# Patient Record
Sex: MALE | Race: OTHER | HISPANIC OR LATINO | Employment: UNEMPLOYED | URBAN - METROPOLITAN AREA
[De-identification: names, ages, dates, MRNs, and addresses within clinical notes are randomized per-mention and may not be internally consistent; named-entity substitution may affect disease eponyms.]

---

## 2022-01-04 ENCOUNTER — HOSPITAL ENCOUNTER (EMERGENCY)
Facility: HOSPITAL | Age: 22
Discharge: HOME | End: 2022-01-05
Attending: EMERGENCY MEDICINE

## 2022-01-04 DIAGNOSIS — L05.01 PILONIDAL ABSCESS: Primary | ICD-10-CM

## 2022-01-04 DIAGNOSIS — R00.0 TACHYCARDIA: ICD-10-CM

## 2022-01-04 LAB
BACTERIA #/AREA URNS HPF: NORMAL /HPF
BASOPHILS # BLD AUTO: 0.06 K/UL (ref 0–0.2)
BASOPHILS NFR BLD: 0.3 % (ref 0–1.9)
BILIRUB UR QL STRIP: NEGATIVE
CLARITY UR: CLEAR
COLOR UR: YELLOW
CTP QC/QA: YES
DIFFERENTIAL METHOD: ABNORMAL
EOSINOPHIL # BLD AUTO: 0.1 K/UL (ref 0–0.5)
EOSINOPHIL NFR BLD: 0.3 % (ref 0–8)
ERYTHROCYTE [DISTWIDTH] IN BLOOD BY AUTOMATED COUNT: 12.3 % (ref 11.5–14.5)
GLUCOSE UR QL STRIP: NEGATIVE
HCT VFR BLD AUTO: 44.9 % (ref 40–54)
HGB BLD-MCNC: 14.8 G/DL (ref 14–18)
HGB UR QL STRIP: ABNORMAL
HYALINE CASTS #/AREA URNS LPF: 0 /LPF
IMM GRANULOCYTES # BLD AUTO: 0.06 K/UL (ref 0–0.04)
IMM GRANULOCYTES NFR BLD AUTO: 0.3 % (ref 0–0.5)
KETONES UR QL STRIP: ABNORMAL
LACTATE SERPL-SCNC: 1.1 MMOL/L (ref 0.5–2.2)
LEUKOCYTE ESTERASE UR QL STRIP: NEGATIVE
LYMPHOCYTES # BLD AUTO: 2.5 K/UL (ref 1–4.8)
LYMPHOCYTES NFR BLD: 13.3 % (ref 18–48)
MCH RBC QN AUTO: 31.6 PG (ref 27–31)
MCHC RBC AUTO-ENTMCNC: 33 G/DL (ref 32–36)
MCV RBC AUTO: 96 FL (ref 82–98)
MICROSCOPIC COMMENT: NORMAL
MONOCYTES # BLD AUTO: 1.7 K/UL (ref 0.3–1)
MONOCYTES NFR BLD: 9.3 % (ref 4–15)
NEUTROPHILS # BLD AUTO: 14.3 K/UL (ref 1.8–7.7)
NEUTROPHILS NFR BLD: 76.5 % (ref 38–73)
NITRITE UR QL STRIP: NEGATIVE
NRBC BLD-RTO: 0 /100 WBC
PH UR STRIP: 6 [PH] (ref 5–8)
PLATELET # BLD AUTO: 374 K/UL (ref 150–450)
PMV BLD AUTO: 9.8 FL (ref 9.2–12.9)
PROT UR QL STRIP: ABNORMAL
RBC # BLD AUTO: 4.69 M/UL (ref 4.6–6.2)
RBC #/AREA URNS HPF: 2 /HPF (ref 0–4)
SARS-COV-2 RDRP RESP QL NAA+PROBE: NEGATIVE
SP GR UR STRIP: 1.02 (ref 1–1.03)
URN SPEC COLLECT METH UR: ABNORMAL
UROBILINOGEN UR STRIP-ACNC: ABNORMAL EU/DL
WBC # BLD AUTO: 18.71 K/UL (ref 3.9–12.7)
WBC #/AREA URNS HPF: 4 /HPF (ref 0–5)

## 2022-01-04 PROCEDURE — 93010 ELECTROCARDIOGRAM REPORT: CPT | Mod: ,,, | Performed by: INTERNAL MEDICINE

## 2022-01-04 PROCEDURE — 10080 I&D PILONIDAL CYST SIMPLE: CPT

## 2022-01-04 PROCEDURE — 83605 ASSAY OF LACTIC ACID: CPT | Performed by: EMERGENCY MEDICINE

## 2022-01-04 PROCEDURE — 25000003 PHARM REV CODE 250: Performed by: EMERGENCY MEDICINE

## 2022-01-04 PROCEDURE — 85025 COMPLETE CBC W/AUTO DIFF WBC: CPT | Performed by: EMERGENCY MEDICINE

## 2022-01-04 PROCEDURE — U0002 COVID-19 LAB TEST NON-CDC: HCPCS | Performed by: EMERGENCY MEDICINE

## 2022-01-04 PROCEDURE — 93010 EKG 12-LEAD: ICD-10-PCS | Mod: ,,, | Performed by: INTERNAL MEDICINE

## 2022-01-04 PROCEDURE — 80053 COMPREHEN METABOLIC PANEL: CPT | Performed by: EMERGENCY MEDICINE

## 2022-01-04 PROCEDURE — 87070 CULTURE OTHR SPECIMN AEROBIC: CPT | Performed by: EMERGENCY MEDICINE

## 2022-01-04 PROCEDURE — 93005 ELECTROCARDIOGRAM TRACING: CPT

## 2022-01-04 PROCEDURE — 87040 BLOOD CULTURE FOR BACTERIA: CPT | Mod: 59 | Performed by: EMERGENCY MEDICINE

## 2022-01-04 PROCEDURE — 81000 URINALYSIS NONAUTO W/SCOPE: CPT | Performed by: EMERGENCY MEDICINE

## 2022-01-04 PROCEDURE — 99285 EMERGENCY DEPT VISIT HI MDM: CPT | Mod: 25

## 2022-01-04 RX ORDER — METRONIDAZOLE 500 MG/1
500 TABLET ORAL
Status: COMPLETED | OUTPATIENT
Start: 2022-01-04 | End: 2022-01-04

## 2022-01-04 RX ORDER — SULFAMETHOXAZOLE AND TRIMETHOPRIM 800; 160 MG/1; MG/1
1 TABLET ORAL
Status: COMPLETED | OUTPATIENT
Start: 2022-01-04 | End: 2022-01-04

## 2022-01-04 RX ORDER — LIDOCAINE HYDROCHLORIDE AND EPINEPHRINE 20; 10 MG/ML; UG/ML
10 INJECTION, SOLUTION INFILTRATION; PERINEURAL ONCE
Status: COMPLETED | OUTPATIENT
Start: 2022-01-04 | End: 2022-01-04

## 2022-01-04 RX ADMIN — SULFAMETHOXAZOLE AND TRIMETHOPRIM 1 TABLET: 800; 160 TABLET ORAL at 10:01

## 2022-01-04 RX ADMIN — LIDOCAINE HYDROCHLORIDE,EPINEPHRINE BITARTRATE 10 ML: 20; .01 INJECTION, SOLUTION INFILTRATION; PERINEURAL at 10:01

## 2022-01-04 RX ADMIN — METRONIDAZOLE 500 MG: 500 TABLET ORAL at 10:01

## 2022-01-05 VITALS
RESPIRATION RATE: 16 BRPM | DIASTOLIC BLOOD PRESSURE: 88 MMHG | HEART RATE: 99 BPM | OXYGEN SATURATION: 99 % | SYSTOLIC BLOOD PRESSURE: 137 MMHG | BODY MASS INDEX: 23.99 KG/M2 | TEMPERATURE: 100 F | WEIGHT: 162 LBS | HEIGHT: 69 IN

## 2022-01-05 LAB
ALBUMIN SERPL BCP-MCNC: 4.2 G/DL (ref 3.5–5.2)
ALP SERPL-CCNC: 107 U/L (ref 55–135)
ALT SERPL W/O P-5'-P-CCNC: 21 U/L (ref 10–44)
ANION GAP SERPL CALC-SCNC: 13 MMOL/L (ref 8–16)
AST SERPL-CCNC: 16 U/L (ref 10–40)
BILIRUB SERPL-MCNC: 1.1 MG/DL (ref 0.1–1)
BUN SERPL-MCNC: 7 MG/DL (ref 6–20)
CALCIUM SERPL-MCNC: 9.7 MG/DL (ref 8.7–10.5)
CHLORIDE SERPL-SCNC: 102 MMOL/L (ref 95–110)
CO2 SERPL-SCNC: 26 MMOL/L (ref 23–29)
CREAT SERPL-MCNC: 0.9 MG/DL (ref 0.5–1.4)
EST. GFR  (AFRICAN AMERICAN): >60 ML/MIN/1.73 M^2
EST. GFR  (NON AFRICAN AMERICAN): >60 ML/MIN/1.73 M^2
GLUCOSE SERPL-MCNC: 104 MG/DL (ref 70–110)
POTASSIUM SERPL-SCNC: 3.5 MMOL/L (ref 3.5–5.1)
PROT SERPL-MCNC: 9.2 G/DL (ref 6–8.4)
SODIUM SERPL-SCNC: 141 MMOL/L (ref 136–145)

## 2022-01-05 PROCEDURE — 25500020 PHARM REV CODE 255: Performed by: EMERGENCY MEDICINE

## 2022-01-05 RX ORDER — SULFAMETHOXAZOLE AND TRIMETHOPRIM 800; 160 MG/1; MG/1
1 TABLET ORAL 2 TIMES DAILY
Qty: 14 TABLET | Refills: 0 | Status: SHIPPED | OUTPATIENT
Start: 2022-01-05 | End: 2022-01-12

## 2022-01-05 RX ORDER — METRONIDAZOLE 500 MG/1
500 TABLET ORAL 3 TIMES DAILY
Qty: 21 TABLET | Refills: 0 | Status: SHIPPED | OUTPATIENT
Start: 2022-01-05 | End: 2022-01-12

## 2022-01-05 RX ADMIN — IOHEXOL 60 ML: 350 INJECTION, SOLUTION INTRAVENOUS at 01:01

## 2022-01-05 NOTE — ED TRIAGE NOTES
Pt arrives complaining of an abscess on his tailbone. Pt states it is an ongoing issue for the last 7yrs and has to have it drained every few months. Pt states this particular flare-up started on 12/30/21 and has not gotten any better. Pt denies any fever or chills at this time.

## 2022-01-05 NOTE — DISCHARGE INSTRUCTIONS
You were seen in the emergency department for an abscess on your buttocks.  We were able to drain it and placed packing.  Please change the packing every 1-2 days.  We have given you a prescription for antibiotics.  Please take it as directed.  Please follow up with your primary care provider or other healthcare professional in the next week for a wound check.  Please return for any new or worsening pain, fevers, worsening redness, spreading rash, numbness, shortness of breath, dizziness, or you become concerned in any other way.     The abscess will likely return.  Please follow-up with a general surgeon to see if further surgery would be curative.
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
## 2022-01-06 ENCOUNTER — HOSPITAL ENCOUNTER (EMERGENCY)
Facility: HOSPITAL | Age: 22
Discharge: HOME | End: 2022-01-06
Attending: EMERGENCY MEDICINE

## 2022-01-06 VITALS
OXYGEN SATURATION: 99 % | RESPIRATION RATE: 20 BRPM | HEART RATE: 87 BPM | DIASTOLIC BLOOD PRESSURE: 84 MMHG | SYSTOLIC BLOOD PRESSURE: 129 MMHG | WEIGHT: 162 LBS | BODY MASS INDEX: 23.99 KG/M2 | TEMPERATURE: 100 F | HEIGHT: 69 IN

## 2022-01-06 DIAGNOSIS — L05.01 PILONIDAL CYST WITH ABSCESS: Primary | ICD-10-CM

## 2022-01-06 DIAGNOSIS — Z48.00 ABSCESS PACKING REMOVAL: ICD-10-CM

## 2022-01-06 PROCEDURE — 99282 EMERGENCY DEPT VISIT SF MDM: CPT

## 2022-01-07 NOTE — ED PROVIDER NOTES
Encounter Date: 1/6/2022       History     Chief Complaint   Patient presents with    packing removal      Pt reports that he was told to come to ED today to have packing removed that was placed on 1/4 for I&D of cyst to tailbone area. Pt reports little drainage to area. Pt denies fever/chills.      21-year-old male presents for evaluation of pilonidal cyst and packing removal.  Reports that pain is greatly improved.  Reports compliance with prescribed antibiotics.  Reports that he was instructed to follow up with a general surgeon but he has not yet done so.  Denies any new worsening pain, fevers, or any additional new symptoms.       [id number] used to communicate with patient.      The history is provided by the patient. The history is limited by a language barrier. A  was used.     Review of patient's allergies indicates:   Allergen Reactions    Pineapple      No past medical history on file.  History reviewed. No pertinent surgical history.  No family history on file.  Social History     Tobacco Use    Smoking status: Never Smoker    Smokeless tobacco: Never Used   Substance Use Topics    Alcohol use: Never    Drug use: Never     Review of Systems   Constitutional: Negative for chills and fever.   HENT: Negative for congestion, postnasal drip, rhinorrhea, sinus pressure and sore throat.    Eyes: Negative for pain and redness.   Respiratory: Negative for apnea, cough, choking, chest tightness, shortness of breath, wheezing and stridor.    Cardiovascular: Negative for chest pain, palpitations and leg swelling.   Gastrointestinal: Negative for abdominal pain, diarrhea, nausea and vomiting.   Genitourinary: Negative for dysuria, flank pain, penile pain and urgency.   Musculoskeletal: Negative for arthralgias, back pain, gait problem, joint swelling, myalgias, neck pain and neck stiffness.   Skin: Positive for wound. Negative for color change, pallor and rash.   Neurological:  Negative for dizziness, tremors, seizures, syncope and light-headedness.   Psychiatric/Behavioral: Negative for confusion. The patient is not nervous/anxious.        Physical Exam     Initial Vitals [01/06/22 1747]   BP Pulse Resp Temp SpO2   129/84 87 20 99.8 °F (37.7 °C) 99 %      MAP       --         Physical Exam    Nursing note and vitals reviewed.  Constitutional: He appears well-developed and well-nourished. He is not diaphoretic. No distress.   HENT:   Head: Normocephalic and atraumatic.   Right Ear: External ear normal.   Left Ear: External ear normal.   Nose: Nose normal.   Eyes: EOM are normal. Right eye exhibits no discharge. Left eye exhibits no discharge.   Neck: Neck supple. No tracheal deviation present.   Normal range of motion.  Cardiovascular: Normal rate.   Pulmonary/Chest: No stridor. No respiratory distress.   Abdominal: Abdomen is soft. He exhibits no distension. There is no abdominal tenderness.   Musculoskeletal:         General: No tenderness. Normal range of motion.      Cervical back: Normal range of motion and neck supple.     Neurological: He is alert and oriented to person, place, and time. He has normal strength. No cranial nerve deficit.   Skin: Skin is warm and dry.   Psychiatric: He has a normal mood and affect. His behavior is normal. Judgment and thought content normal.         ED Course   Procedures  Labs Reviewed - No data to display       Imaging Results    None          Medications - No data to display  Medical Decision Making:   History:   Old Medical Records: I decided to obtain old medical records.  Differential Diagnosis:   Pilonidal abscess, systemic infection, pilonidal cyst, others  ED Management:  History and physical exam as above.    Dressing and packing removed.  Large amount of purulent drainage on the dressings.  No additional drainage is able to be expressed from the incision site.  There is no associated erythema, induration, warmth, or tenderness.  Advised  patient to continue antibiotics as prescribed until they are gone.  Emphasized the importance of follow-up with General surgery.  No evidence of emergent pathology at this time.                      Clinical Impression:   Final diagnoses:  [L05.01] Pilonidal cyst with abscess (Primary)  [Z48.00] Abscess packing removal          ED Disposition Condition    Discharge Stable        ED Prescriptions     None        Follow-up Information     Follow up With Specialties Details Why Contact Info    Rod Carbajal MD General Surgery Schedule an appointment as soon as possible for a visit in 1 week For further evaluation [address]  [address]  [facility]  [facility] [address]  [phone]      [facility] Emergency Dept Emergency Medicine Go to  If symptoms worsen, As needed [address] [facility] [address] [phone]           Shane Cervantes NP  01/06/22 1958

## 2022-01-07 NOTE — DISCHARGE INSTRUCTIONS
Nessa un seguimiento con un cirujano irma se discutió. Continúe tomando todos los antibióticos hasta que desaparezcan por completo.    Fercho por venir a nuestro Departamento de Emergencias hoy. Es importante recordar que algunos problemas son difíciles de diagnosticar y es posible que no se detecten carlene coppola primera visita. Asegúrese de hacer un seguimiento con coppola médico de atención primaria.  Si no tiene farideh, puede comunicarse con el que figura en coppola documentación de serafin o también puede llamar a la [facility] al [phone] para programar ashish dirk con farideh.    Regrese a la janine de emergencias con cualquier pregunta / inquietud, síntomas nuevos / preocupantes, empeoramiento o falta de mejora. No conduzca ni tome decisiones importantes carlene 24 horas si ha recibido analgésicos, sedantes o fármacos que alteran el estado de ánimo carlene coppola visita a la janine de emergencias.

## 2022-01-09 LAB
BACTERIA BLD CULT: NORMAL
BACTERIA BLD CULT: NORMAL
BACTERIA SPEC AEROBE CULT: NO GROWTH

## 2023-09-19 ENCOUNTER — HOSPITAL ENCOUNTER (EMERGENCY)
Facility: HOSPITAL | Age: 23
Discharge: HOME | End: 2023-09-19
Attending: EMERGENCY MEDICINE

## 2023-09-19 VITALS
DIASTOLIC BLOOD PRESSURE: 85 MMHG | BODY MASS INDEX: 24.2 KG/M2 | WEIGHT: 169 LBS | TEMPERATURE: 99 F | OXYGEN SATURATION: 99 % | SYSTOLIC BLOOD PRESSURE: 143 MMHG | HEART RATE: 96 BPM | RESPIRATION RATE: 18 BRPM | HEIGHT: 70 IN

## 2023-09-19 DIAGNOSIS — M25.522 LEFT ELBOW PAIN: Primary | ICD-10-CM

## 2023-09-19 PROCEDURE — 99281 EMR DPT VST MAYX REQ PHY/QHP: CPT

## 2023-09-19 NOTE — ED PROVIDER NOTES
"Encounter Date: 9/19/2023    SCRIBE #1 NOTE: I, Nasrin La, am scribing for, and in the presence of,  Jhon Bravo PA-C.       History     Chief Complaint   Patient presents with    Tingling     Pt reports tinging to left arm that started around 3:40 PM today. Denies any blurred vision, no weakness, no pronator drift.      21 yo M with no PMHx presents to the ED for paresthesias to the L elbow onset around 1h PTA today after caffeine intake. He describes the sensation as "crawling" in character. Also notes similar sensation in his L leg, with associated neck discomfort and dyspnea. States the paresthesias in his L elbow is currently still persistent, but all his other sx had resolved. He is R handed. No trauma or injuries or falls. Denies pain with movement of arm. No other exacerbating or alleviating factors. Denies L hand or wrist pain, nausea, vomiting, fever, or other associated symptoms. States he has experienced sx of anxiety in the past which may have happened today.     The history is provided by the patient. The history is limited by a language barrier (Swedish). A  was used (ID [id number]).     Review of patient's allergies indicates:   Allergen Reactions    Pineapple      History reviewed. No pertinent past medical history.  History reviewed. No pertinent surgical history.  History reviewed. No pertinent family history.  Social History     Tobacco Use    Smoking status: Never    Smokeless tobacco: Never   Substance Use Topics    Alcohol use: Never    Drug use: Never     Review of Systems   Constitutional:  Negative for chills and fever.   HENT:  Negative for congestion, rhinorrhea and sore throat.    Eyes:  Negative for visual disturbance.   Respiratory:  Positive for shortness of breath (resolved). Negative for cough.    Cardiovascular:  Negative for chest pain.   Gastrointestinal:  Negative for abdominal pain, diarrhea, nausea and vomiting.   Genitourinary:  Negative for dysuria, " frequency and hematuria.   Musculoskeletal:  Positive for neck pain (resolved). Negative for back pain.   Skin:  Negative for rash.   Neurological:  Negative for dizziness, weakness and headaches.        +paresthesias to L elbow, L leg.        Physical Exam     Initial Vitals [09/19/23 1627]   BP Pulse Resp Temp SpO2   (!) 143/85 96 18 98.5 °F (36.9 °C) 99 %      MAP       --         Physical Exam    Nursing note and vitals reviewed.  Constitutional: He appears well-developed and well-nourished. He is not diaphoretic. No distress.   HENT:   Head: Atraumatic.   Right Ear: External ear normal.   Left Ear: External ear normal.   Eyes: Conjunctivae are normal.   Neck: No tracheal deviation present.   Normal range of motion.  Cardiovascular:  Normal rate and regular rhythm.           Pulmonary/Chest: No accessory muscle usage or stridor. No tachypnea. No respiratory distress.   Musculoskeletal:         General: No tenderness or edema. Normal range of motion.      Cervical back: Normal range of motion.     Neurological: He has normal strength. He displays no tremor. He displays no seizure activity. Coordination and gait normal.   Skin: Skin is intact. Capillary refill takes less than 2 seconds. No cyanosis.         ED Course   Procedures  Labs Reviewed - No data to display       Imaging Results    None          Medications - No data to display  Medical Decision Making  Normal exam.  No neurologic deficits.  Majority of symptoms have since dissipated.  Endorses history of anxiety and states all symptoms began after a large caffeine intake.  Patient believe symptoms could be related to anxiety.  Atraumatic.  No signs of vascular compromise or infectious process.  Not consistent with radicular process.  I doubt stroke and occult cardiopulmonary process.  No vital sign changes or other associated symptoms concerning for metabolic disturbance today.  Reassured. Requesting work note.             Scribe Attestation:   Scribe #1:  I performed the above scribed service and the documentation accurately describes the services I performed. I attest to the accuracy of the note.                      I, Jhon Bravo PA-C, personally performed the services described in this documentation. All medical record entries made by the scribe were at my direction and in my presence. I have reviewed the chart and agree that the record reflects my personal performance and is accurate and complete.    Clinical Impression:   Final diagnoses:  [M25.522] Left elbow pain (Primary)        ED Disposition Condition    Discharge Stable          ED Prescriptions    None       Follow-up Information       Follow up With Specialties Details Why Contact Info    [facility] -  Schedule an appointment as soon as possible for a visit in 1 day For reevaluation, AND to establish primary if you don't have a  [facility] [address]  [phone]      [facility] Emergency Dept Emergency Medicine Go to  If symptoms worsen or new symptoms develop [address]  [facility] [id number] [phone]             Jhon Bravo PA-C  09/19/23 3559

## 2023-09-19 NOTE — Clinical Note
"Edgar Hermosillo" Domingo Dhillon was seen and treated in our emergency department on 9/19/2023.  He may return to school on 09/20/2023.      If you have any questions or concerns, please don't hesitate to call.      Jhon Bravo, STELLA"

## 2023-09-19 NOTE — DISCHARGE INSTRUCTIONS
Fercho por venir a nuestro Departamento de Emergencias hoy. Es importante recordar que algunos problemas son difíciles de diagnosticar y es posible que no se detecten carlene coppola primera visita. Asegúrese de hacer un seguimiento con coppola médico de atención primaria.  Si no tiene farideh, puede comunicarse con el que figura en coppola documentación de serafin o también puede llamar a la [facility] al [phone] para programar ashish dirk con farideh.    Regrese a la janine de emergencias con cualquier pregunta / inquietud, síntomas nuevos / preocupantes, empeoramiento o falta de mejora. No conduzca ni tome decisiones importantes carlene 24 horas si ha recibido analgésicos, sedantes o fármacos que alteran el estado de ánimo carlene coppola visita a la janine de emergencias.

## 2024-11-16 ENCOUNTER — HOSPITAL ENCOUNTER (EMERGENCY)
Facility: HOSPITAL | Age: 24
Discharge: HOME | End: 2024-11-16
Attending: STUDENT IN AN ORGANIZED HEALTH CARE EDUCATION/TRAINING PROGRAM

## 2024-11-16 VITALS
RESPIRATION RATE: 18 BRPM | OXYGEN SATURATION: 100 % | DIASTOLIC BLOOD PRESSURE: 79 MMHG | SYSTOLIC BLOOD PRESSURE: 118 MMHG | WEIGHT: 169 LBS | BODY MASS INDEX: 24.2 KG/M2 | HEIGHT: 70 IN | HEART RATE: 82 BPM | TEMPERATURE: 98 F

## 2024-11-16 DIAGNOSIS — R51.9 ACUTE NONINTRACTABLE HEADACHE, UNSPECIFIED HEADACHE TYPE: Primary | ICD-10-CM

## 2024-11-16 LAB
ALLENS TEST: ABNORMAL
ANION GAP SERPL CALC-SCNC: 18 MMOL/L (ref 8–16)
BUN SERPL-MCNC: 17 MG/DL (ref 6–30)
CHLORIDE SERPL-SCNC: 101 MMOL/L (ref 95–110)
CREAT SERPL-MCNC: 0.8 MG/DL (ref 0.5–1.4)
GLUCOSE SERPL-MCNC: 110 MG/DL (ref 70–110)
HCT VFR BLD CALC: 43 %PCV (ref 36–54)
POC IONIZED CALCIUM: 1.25 MMOL/L (ref 1.06–1.42)
POC TCO2 (MEASURED): 24 MMOL/L (ref 23–29)
POTASSIUM BLD-SCNC: 3.8 MMOL/L (ref 3.5–5.1)
SAMPLE: ABNORMAL
SITE: ABNORMAL
SODIUM BLD-SCNC: 138 MMOL/L (ref 136–145)

## 2024-11-16 PROCEDURE — 25000003 PHARM REV CODE 250: Performed by: PHYSICIAN ASSISTANT

## 2024-11-16 PROCEDURE — 82565 ASSAY OF CREATININE: CPT

## 2024-11-16 PROCEDURE — 99285 EMERGENCY DEPT VISIT HI MDM: CPT | Mod: 25

## 2024-11-16 PROCEDURE — 82962 GLUCOSE BLOOD TEST: CPT

## 2024-11-16 PROCEDURE — 99900035 HC TECH TIME PER 15 MIN (STAT)

## 2024-11-16 PROCEDURE — 25500020 PHARM REV CODE 255: Performed by: STUDENT IN AN ORGANIZED HEALTH CARE EDUCATION/TRAINING PROGRAM

## 2024-11-16 PROCEDURE — 84132 ASSAY OF SERUM POTASSIUM: CPT

## 2024-11-16 PROCEDURE — 85014 HEMATOCRIT: CPT

## 2024-11-16 PROCEDURE — 84295 ASSAY OF SERUM SODIUM: CPT

## 2024-11-16 PROCEDURE — 82330 ASSAY OF CALCIUM: CPT

## 2024-11-16 RX ORDER — KETOROLAC TROMETHAMINE 30 MG/ML
15 INJECTION, SOLUTION INTRAMUSCULAR; INTRAVENOUS
Status: DISCONTINUED | OUTPATIENT
Start: 2024-11-16 | End: 2024-11-16

## 2024-11-16 RX ORDER — SPIRONOLACTONE 100 MG/1
100 TABLET, FILM COATED ORAL DAILY
COMMUNITY

## 2024-11-16 RX ORDER — ESTRADIOL 2 MG/1
2 TABLET ORAL DAILY
COMMUNITY

## 2024-11-16 RX ORDER — ACETAMINOPHEN 500 MG
1000 TABLET ORAL
Status: COMPLETED | OUTPATIENT
Start: 2024-11-16 | End: 2024-11-16

## 2024-11-16 RX ADMIN — ACETAMINOPHEN 1000 MG: 500 TABLET ORAL at 03:11

## 2024-11-16 RX ADMIN — IOHEXOL 100 ML: 350 INJECTION, SOLUTION INTRAVENOUS at 04:11

## 2024-11-16 NOTE — ED PROVIDER NOTES
Encounter Date: 11/16/2024       History     Chief Complaint   Patient presents with    Headache     Pt arrived to the ED due to constant h/a that started today. No relief from motrin taken at 8pm. Denies blurry vision, n/v, chest pain, or SOB. Pt is currently taking hormones      23yo M presents to ED with chief complaint occipital headache x 2d.    Patient states acute onset occipital headache yesterday, resolved.  Began again with similar headache around 1 this morning.  Headache is occipital, described as pressure type headache.  Admits to associated dizziness.  Describes dizziness as feeling a bit unsteady.  Patient took over-the-counter migraine medication which contains Tylenol, aspirin, and caffeine.  Patient thinks may develop dizziness due to caffeine intake, unsure if it began prior to the medication intake.  Denies history of similar headaches.  No recent illness.  Denies sinus issues.  No otalgia.  Denies fever chills myalgias.  No neck pain or stiffness.  Denies head trauma.  Denies known personal or family history of cerebral aneurysm or subarachnoid hemorrhage.  No arm or leg weakness, slurred speech, facial droop, unsteady gait, aphasia.  No history of CVA.  No neck pain or stiffness.  Dizziness resolved prior to my evaluation.  Denies relief of headache with medications taken prior to arrival.    Denies significant pmh    Male transitioning to female. On Estradiol, Spironolactone x 2yrs. No recent medication changes. Local physician.       Review of patient's allergies indicates:   Allergen Reactions    Pineapple      History reviewed. No pertinent past medical history.  History reviewed. No pertinent surgical history.  No family history on file.  Social History     Tobacco Use    Smoking status: Never    Smokeless tobacco: Never   Substance Use Topics    Alcohol use: Never    Drug use: Never     Review of Systems   Constitutional:  Negative for chills and fever.   Eyes:  Negative for visual  disturbance.   Respiratory:  Negative for shortness of breath.    Cardiovascular:  Negative for chest pain.   Gastrointestinal:  Negative for nausea and vomiting.   Musculoskeletal:  Negative for neck pain and neck stiffness.   Neurological:  Positive for dizziness and headaches. Negative for seizures, syncope, facial asymmetry, speech difficulty, weakness and numbness.       Physical Exam     Initial Vitals [11/16/24 0249]   BP Pulse Resp Temp SpO2   129/83 91 20 98.1 °F (36.7 °C) 98 %      MAP       --         Physical Exam    Nursing note and vitals reviewed.  Constitutional: He appears well-developed and well-nourished. He is not diaphoretic. No distress.   Overall well-appearing nontoxic.  Ambulates with normal, steady gait.   HENT:   Head: Normocephalic and atraumatic.   Eyes: EOM are normal.   Neck: Neck supple.   Normal range of motion.  Pulmonary/Chest: No respiratory distress.   Musculoskeletal:         General: No tenderness. Normal range of motion.      Cervical back: Normal range of motion and neck supple.     Neurological: He is alert and oriented to person, place, and time. GCS score is 15. GCS eye subscore is 4. GCS verbal subscore is 5. GCS motor subscore is 6.   Skin: Skin is warm.   Psychiatric: He has a normal mood and affect. Thought content normal.         ED Course   Procedures  Labs Reviewed   ISTAT PROCEDURE - Abnormal       Result Value    POC Glucose 110      POC BUN 17      POC Creatinine 0.8      POC Sodium 138      POC Potassium 3.8      POC Chloride 101      POC TCO2 (MEASURED) 24      POC Anion Gap 18 (*)     POC Ionized Calcium 1.25      POC Hematocrit 43      Sample VENOUS      Site Other      Allens Test N/A     ISTAT CHEM8          Imaging Results              CTA Head and Neck (xpd) (Final result)  Result time 11/16/24 05:06:58      Final result by Breanna Rojas MD (11/16/24 05:06:58)                   Impression:      No CT evidence of acute intracranial abnormality.  If the  patient's headaches are sufficiently clinically suspicious, or associated with signs of elevated ICP, focal neurologic deficits, nausea, or vomiting or if there is clinical concern for acute ischemia, further evaluation with MRI is recommended if there are no clinical contraindications.    CTA head and neck appears within normal limits without evidence of hemodynamically significant stenosis or proximal large vessel intracranial occlusion.      Electronically signed by: Breanna Rojas MD  Date:    11/16/2024  Time:    05:06               Narrative:    EXAMINATION:  CTA HEAD AND NECK (XPD)    CLINICAL HISTORY:  Dizziness, persistent/recurrent, cardiac or vascular cause suspected;occipital headache, dizziness; estrogen use; question sinus thrombus;    TECHNIQUE:  Non contrast low dose axial images were obtained though the head. CT angiogram was performed from the level of the nori to the top of the head following the IV administration of 100mL of Omnipaque 350.   Sagittal and coronal reconstructions and maximum intensity projection reconstructions were performed. Arterial stenosis percentages are based on NASCET measurement criteria.    COMPARISON:  None    FINDINGS:  Head CT:    There is no acute intracranial hemorrhage, hydrocephalus, midline shift or mass effect. Gray-white matter differentiation appears maintained. The basal cisterns are patent. The visualized paranasal sinuses and mastoid air cells are clear of acute process.  The visualized bones of the calvarium demonstrate no acute osseous abnormality    CTA:    Aorta: Normal 3 vessel arch.    Extracranial carotid circulation: No hemodynamically significant stenosis, aneurysmal dilatation, or dissection.    Extracranial vertebral circulation: No hemodynamically significant stenosis, aneurysmal dilatation, or dissection.    Intracranial Arteries: The bilateral petrous, cavernous and supraclinoid segments the ICAs are patent.The right A1 segment is hypoplastic  relative to the left.  The proximal ACAs are patent.  Bilateral MCAs appear patent.  The distal vertebral arteries and basilar artery appear patent.  The bilateral PCAs appear patent.    Venous structures (limited evaluation): Allowing for limitation of CT technique, the dural venous sinuses appear appropriately opacified noting the left transverse and sigmoid sinus appear hypoplastic relative to the right.    Non-Vascular Structures of the Neck/Thoracic Inlet (limited evaluation): The visualized mucosal surfaces of the aerodigestive tract are within normal limits.  The vocal cords are relatively symmetric.  The parotid glands and submandibular glands are unremarkable.  The thyroid gland is within normal limits.  There are normal and mildly prominent cervical chain lymph nodes present.  The visualized lung apices are free of pleural fluid or focal consolidation.  No definite evidence of large central pulmonary embolus.  Visualized osseous structures appear intact.                                       Medications   acetaminophen tablet 1,000 mg (1,000 mg Oral Given 11/16/24 0328)   iohexoL (OMNIPAQUE 350) injection 100 mL (100 mLs Intravenous Given 11/16/24 0407)     Medical Decision Making  Differential diagnosis:  Dural venous thrombus, CVA, headache, migraine, subarachnoid hemorrhage, cerebral aneurysm    Amount and/or Complexity of Data Reviewed  External Data Reviewed: notes.  Radiology: ordered and independent interpretation performed. Decision-making details documented in ED Course.  Discussion of management or test interpretation with external provider(s): Given reported dizziness, estrogen use,     Risk  OTC drugs.  Prescription drug management.               ED Course as of 11/16/24 0520   Sat Nov 16, 2024   0519 Headache resolved on re-evaluation.    Although I have not definitively ruled out dural venous thrombus, given grossly unremarkable CTA, resolution of headaches, no persistent dizziness, no focal  deficits, I do feel we can attempted outpatient follow-up.  Advised contacting PCP for repeat exam and re-evaluation of any persistent symptoms.  Discussed over-the-counter Tylenol, ibuprofen as needed for continued headache.  Discussed interim return precautions, patient is comfortable with plan, feels comfortable with discharge. [SM]      ED Course User Index  [SM] William Loredo PA-C                           Clinical Impression:  Final diagnoses:  [R51.9] Acute nonintractable headache, unspecified headache type (Primary)          ED Disposition Condition    Discharge Stable          ED Prescriptions    None       Follow-up Information       Follow up With Specialties Details Why Contact Info    Primary care provider  Schedule an appointment as soon as possible for a visit  For reevaluation              William Loredo, STELLA  11/16/24 [id number]

## 2024-11-16 NOTE — DISCHARGE INSTRUCTIONS
Comuníquese con coppola médico de cabecera para que le linh un seguimiento y ashish reevaluación, especialmente si los yue de usha continúan. Puede marietta Tylenol o ibuprofeno de venta marti según sea necesario para el dolor de usha. Regrese a kj servicio de urgencias si no hay mejoría con los medicamentos actuales, si comienza a tener náuseas y vómitos, si presenta mareos, si se siente mareado o siente que se va a desmayar, si tiene dificultad para caminar, si comienza a tener ashish sensibilidad grave a la lisandra o si se presenta cualquier otro problema.    Contact your primary care provider for follow-up and re-evaluation, especially if headaches continue.  You can take over-the-counter Tylenol or ibuprofen as needed for headache.  Return to this ED if there is no improvement with current medicines, if you begin with nausea vomiting, if you develop dizziness, if you feel lightheaded or feel as if you are going to pass out, if you have difficulty walking, if you begin with severe sensitivity to light, if any other problems occur.

## 2025-03-22 ENCOUNTER — HOSPITAL ENCOUNTER (EMERGENCY)
Facility: HOSPITAL | Age: 25
Discharge: HOME | End: 2025-03-22
Attending: STUDENT IN AN ORGANIZED HEALTH CARE EDUCATION/TRAINING PROGRAM

## 2025-03-22 VITALS
DIASTOLIC BLOOD PRESSURE: 75 MMHG | SYSTOLIC BLOOD PRESSURE: 120 MMHG | WEIGHT: 170 LBS | RESPIRATION RATE: 20 BRPM | BODY MASS INDEX: 24.39 KG/M2 | OXYGEN SATURATION: 100 % | TEMPERATURE: 98 F | HEART RATE: 74 BPM

## 2025-03-22 DIAGNOSIS — S05.01XA ABRASION OF RIGHT CORNEA, INITIAL ENCOUNTER: Primary | ICD-10-CM

## 2025-03-22 PROCEDURE — 25000003 PHARM REV CODE 250

## 2025-03-22 PROCEDURE — 99283 EMERGENCY DEPT VISIT LOW MDM: CPT

## 2025-03-22 RX ORDER — ERYTHROMYCIN 5 MG/G
OINTMENT OPHTHALMIC
Status: COMPLETED | OUTPATIENT
Start: 2025-03-22 | End: 2025-03-22

## 2025-03-22 RX ORDER — PROPARACAINE HYDROCHLORIDE 5 MG/ML
1 SOLUTION/ DROPS OPHTHALMIC
Status: COMPLETED | OUTPATIENT
Start: 2025-03-22 | End: 2025-03-22

## 2025-03-22 RX ORDER — ERYTHROMYCIN 5 MG/G
OINTMENT OPHTHALMIC
Qty: 1 G | Refills: 0 | Status: SHIPPED | OUTPATIENT
Start: 2025-03-22

## 2025-03-22 RX ADMIN — FLUORESCEIN SODIUM 1 EACH: 1 STRIP OPHTHALMIC at 10:03

## 2025-03-22 RX ADMIN — PROPARACAINE HYDROCHLORIDE 1 DROP: 5 SOLUTION/ DROPS OPHTHALMIC at 10:03

## 2025-03-22 RX ADMIN — ERYTHROMYCIN: 5 OINTMENT OPHTHALMIC at 11:03

## 2025-03-22 NOTE — ED TRIAGE NOTES
"Pt presented to the ED with complaints of R eye pain that began this morning. Pt reports "I was walking out side and it felt like something either got into my eye or bit my eye". Pt reports wearing glasses daily but is not wearing them currently. R eye does appear slight pink upon initial assessment.   "

## 2025-03-22 NOTE — Clinical Note
"Edgar Hermosillo" Domingo Dhillon was seen and treated in our emergency department on 3/22/2025.  He may return to work on 03/25/2025.       If you have any questions or concerns, please don't hesitate to call.      Keith Madera PA-C"

## 2025-03-22 NOTE — DISCHARGE INSTRUCTIONS
Si tiene ashish abrasión corneal o un rasguño en el claudy, use el ungüento según lo prescrito. Consulte con coppola oftalmólogo en las próximas 24 horas.   Fercho por venir a nuestro Departamento de Emergencias hoy. Es importante recordar que algunos problemas son difíciles de diagnosticar y es posible que no se detecten carlene coppola primera visita. Asegúrese de hacer un seguimiento con coppola médico de atención primaria.  Si no tiene farideh, puede comunicarse con el que figura en coppola documentación de serafin o también puede llamar a la [facility] al [phone] para programar ashish dirk con farideh.    Regrese a la janine de emergencias con cualquier pregunta / inquietud, síntomas nuevos / preocupantes, empeoramiento o falta de mejora. No conduzca ni tome decisiones importantes carlene 24 horas si ha recibido analgésicos, sedantes o fármacos que alteran el estado de ánimo carlene coppola visita a la janine de emergencias.

## 2025-03-22 NOTE — ED NOTES
Visual acuity screening completed. R eye 20/40. L eye 20/50. Bilateral vision 20/50. Pt reports wearing glasses daily but is not wearing them currently.

## 2025-03-22 NOTE — ED PROVIDER NOTES
Encounter Date: 3/22/2025    SCRIBE #1 NOTE: I, Barbara Rollins, am scribing for, and in the presence of,  Keith Madera PA-C. I have scribed the following portions of the note - Other sections scribed: HPI, ROS.       History     Chief Complaint   Patient presents with    Eye Pain     Redness to right eye since this morning. Pt denies injury . Pt denies vision change      Edgarcharlene Dhillon is a 24 y.o. adult, with no pertinent PMHx, who presents to the ED with acute R eye pain since this morning. Patient reports leaving the house when they began feeling a painful burning sensation to internal eye. Patient believes a bug might have stung their eye but did not see a bug when eye pain began. Denies experiencing symptoms in the past. Denies wearing makeup to sleep the night before symptoms began. Denies use of contact lenses. No other exacerbating or alleviating factors. Denies cough, congestion, rhinorrhea, sneezing, nausea, emesis, abdominal pain, diarrhea, vision changes, eye discharge, SOB, globus sensation or other associated symptoms.      The history is provided by the patient. The history is limited by a language barrier. A  was used (ID#:[id number]).     Review of patient's allergies indicates:   Allergen Reactions    Pineapple      History reviewed. No pertinent past medical history.  History reviewed. No pertinent surgical history.  No family history on file.  Social History[1]  Review of Systems   Constitutional:  Negative for chills and fever.   HENT:  Negative for congestion, rhinorrhea, sneezing and sore throat.    Eyes:  Positive for pain and redness. Negative for discharge and visual disturbance.   Respiratory:  Negative for cough and shortness of breath.         (-) globus sensation    Cardiovascular:  Negative for chest pain.   Gastrointestinal:  Negative for abdominal pain, nausea and vomiting.   Genitourinary:  Negative for dysuria.   Skin:  Negative for rash.   Neurological:   Negative for syncope and headaches.       Physical Exam     Initial Vitals [03/22/25 0929]   BP Pulse Resp Temp SpO2   122/61 82 20 97.3 °F (36.3 °C) 100 %      MAP       --         Physical Exam    Nursing note and vitals reviewed.  Constitutional: He appears well-developed and well-nourished.   HENT:   Head: Normocephalic and atraumatic.   Right Ear: External ear normal.   Left Ear: External ear normal.   Nose: Nose normal. Mouth/Throat: Oropharynx is clear and moist.   Eyes: EOM and lids are normal. Pupils are equal, round, and reactive to light. Right eye exhibits no chemosis, no discharge, no exudate and no hordeolum. No foreign body present in the right eye. Left eye exhibits no chemosis, no discharge, no exudate and no hordeolum. No foreign body present in the left eye. Right conjunctiva is injected. Right conjunctiva has no hemorrhage. Left conjunctiva is not injected. Left conjunctiva has no hemorrhage. No scleral icterus.   Wood's lamp examination shows small corneal abrasion on the right eye.  Patient admits to relief of symptoms after administration of proparacaine.   Neck: Trachea normal. Neck supple.   Cardiovascular:  Normal rate, regular rhythm, S1 normal, S2 normal and normal heart sounds.     Exam reveals no gallop and no friction rub.       No murmur heard.  Pulmonary/Chest: Effort normal and breath sounds normal. No respiratory distress. He has no decreased breath sounds. He has no wheezes. He has no rhonchi. He has no rales.   Abdominal: He exhibits no distension.   Musculoskeletal:         General: Normal range of motion.      Cervical back: Neck supple.      Comments: Patient is able to move all extremities. 5/5 strength at upper and lower extremities.        Lymphadenopathy:     He has no cervical adenopathy.   Neurological: He is alert. He has normal strength.   Skin: Skin is warm and dry.   Psychiatric: He has a normal mood and affect.         ED Course   Procedures  Labs Reviewed - No data  to display       Imaging Results    None          Medications   proparacaine 0.5 % ophthalmic solution 1 drop (1 drop Right Eye Given by Provider 3/22/25 1033)   fluorescein ophthalmic strip 1 each (1 each Right Eye Given 3/22/25 1034)   erythromycin 5 mg/gram (0.5 %) ophthalmic ointment ( Right Eye Given 3/22/25 1101)     Medical Decision Making  Encounter Date: 3/22/2025    24 y.o. adult presents for evaluation of right-sided eye pain and redness.  Hemodynamically stable. Afebrile. Phonating and protecting the airway spontaneously. No clinical evidence for cardiovascular instability or impending airway compromise.  Remainder of physical exam as above. Notable for mild erythema to the right eye.  No discharge or drainage.  Prior medical records reviewed. PMD note reviewed. Current co-morbidities considered that will impact clinical decision making include as above.   Vitals range:   Temp:  [97.3 °F (36.3 °C)-98.2 °F (36.8 °C)] 98.2 °F (36.8 °C)  Pulse:  [74-82] 74  Resp:  [20] 20  SpO2:  [100 %] 100 %  BP: (120-122)/(61-75) 120/75    Differential diagnoses includes but is not limited to:  This is an emergent evaluation of a 24 y.o. adult presenting to the ED for eye pain. Denies use of contact lenses, trauma, foreign body sensation, and purulent drainage. Afebrile. Patient is non-toxic appearing and in no acute distress. No gross vision loss. Corneal abrasion present on Wood's lamp examination. No foreign bodies. I considered but doubt orbital compartment syndrome. No Debora's sign or teardrop pupil to suggest open globe injury. No hyphema or subconjunctival hemorrhage. No blepharospasm, ischemia of the conjunctiva, or Hx to suggest ocular chemical exposure. No ciliary flush or pain with consensual light reflex to suggest iritis. I doubt acute angle closure glaucoma. Less consistent with conjunctivitis. No periorbital swelling, warmth, erythema, or tenderness to palpation to suggest periorbital cellulitis.      Symptoms immediately improve with topical anesthetic drops in the ED. Discharged home with erythromycin ointment. Instructed to follow up with Ophthalmology within the next 24 hours.      Clinical picture today most consistent with corneal abrasion of the right eye.   Doubt alternate pathology as listed in the differential above.    ED MEDS GIVEN:  Medications  proparacaine 0.5 % ophthalmic solution 1 drop (1 drop Right Eye Given by Provider 3/22/25 1033)  fluorescein ophthalmic strip 1 each (1 each Right Eye Given 3/22/25 1034)  erythromycin 5 mg/gram (0.5 %) ophthalmic ointment ( Right Eye Given 3/22/25 1101)    Clinical Impression:  Final diagnoses:  [S05.01XA] Abrasion of right cornea, initial encounter (Primary)    I discussed with the patient/family the diagnosis, treatment plan, indications for return to the emergency department, and for expected follow-up. The patient/family verbalized an understanding. The patient/family is asked if there are any questions or concerns. We discuss the case, until all issues are addressed to the patient/family's satisfaction. Patient/family understands and is agreeable to the plan.   Keith Madera    DISCLAIMER: This note was prepared with [email or web address] voice recognition transcription software. Garbled syntax, mangled pronouns, and other bizarre constructions may be attributed to that software system.      Risk  Prescription drug management.            Scribe Attestation:   Scribe #1: I performed the above scribed service and the documentation accurately describes the services I performed. I attest to the accuracy of the note.                           I, Keith Madera PA-C, personally performed the services described in this documentation. All medical record entries made by the scribe were at my direction and in my presence. I have reviewed the chart and agree that the record reflects my personal performance and is accurate and complete.      DISCLAIMER: This note was  prepared with [employer] voice recognition transcription software. Garbled syntax, mangled pronouns, and other bizarre constructions may be attributed to that software system.     Clinical Impression:  Final diagnoses:  [S05.01XA] Abrasion of right cornea, initial encounter (Primary)          ED Disposition Condition    Discharge Stable          ED Prescriptions       Medication Sig Dispense Start Date End Date Auth. Provider    erythromycin (ROMYCIN) ophthalmic ointment Place a 1/2 inch ribbon of ointment into the lower eyelid. 1 g 3/22/2025 -- Keith Madera PA-C          Follow-up Information       Follow up With Specialties Details Why Contact Info Additional Information    [facility] - [address] [place] [address] Ophthalmology Schedule an appointment as soon as possible for a visit in 1 day For further evaluation, more definitive management of symptoms [id number]  [facility] [address] [phone] Please arrive on the 10th floor for check-in.    [facility] Emergency Dept Emergency Medicine Go to  As needed, If symptoms worsen [address]  [facility] [place] [address] [phone]     [facility] -  Schedule an appointment as soon as possible for a visit in 1 day For re-evaluation and to establish care with PCP if you do not already have one [address] [place] [address]  [phone]                    [1]   Social History  Tobacco Use    Smoking status: Never    Smokeless tobacco: Never   Substance Use Topics    Alcohol use: Never    Drug use: Never        Keith Madera PA-C  03/22/25 1124